# Patient Record
Sex: MALE | Race: WHITE | NOT HISPANIC OR LATINO | Employment: PART TIME | URBAN - METROPOLITAN AREA
[De-identification: names, ages, dates, MRNs, and addresses within clinical notes are randomized per-mention and may not be internally consistent; named-entity substitution may affect disease eponyms.]

---
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
## 2021-03-03 ENCOUNTER — NURSE TRIAGE (OUTPATIENT)
Dept: CALL CENTER | Facility: HOSPITAL | Age: 21
End: 2021-03-03

## 2021-03-04 NOTE — TELEPHONE ENCOUNTER
Reason for Disposition  • [1] Recent medical visit within 48 hours AND [2] new symptom AND [3] sounds mild    Additional Information  • Negative: Severe difficulty breathing (struggling for each breath, unable to speak or cry, making grunting noises with each breath, severe retractions)  • Negative: Sounds like a life-threatening emergency to the triager  • Negative: Asthma or Reactive Airway Disease diagnosed OR treated with asthma medicines  • Negative: Bronchiolitis diagnosed recently  • Negative: Ear infection diagnosed recently  • Negative: Influenza diagnosed recently  • Negative: Swimmer's ear diagnosed recently  • Negative: Mononucleosis diagnosed recently  • Negative: Sinus infection diagnosed recently  • Negative: [1] Strep throat diagnosed recently AND [2] taking antibiotic  • Negative: Pneumonia diagnosed recently  • Negative: [1] Urinary tract infection diagnosed recently AND [2] taking antibiotic  • Negative: Whooping Cough diagnosed recently  • Negative: [1] Animal or human bite infection AND [2] taking an antibiotic  • Negative: [1] Boil (skin abscess) AND [2] taking an antibiotic and/or incised and drained  • Negative: [1] Cellulitis AND [2] taking an antibiotic  • Negative: [1] Lymph node infection AND [2] taking an antibiotic  • Negative: [1] Wound infection AND [2] taking an antibiotic  • Negative: Taking antibiotic for other infection  • Negative: More than 48 hours since medical visit  • Negative: [1] Recent medical visit within 48 hours AND [2] condition/symptoms WORSE (Exception: higher fever) AND [3] diagnosis/symptoms covered by triage guideline (e.g., a cold)  • Negative: [1] Difficulty breathing (per caller) AND [2] not severe  • Negative: [1] Dehydration suspected AND [2] age < 1 year (signs: no urine > 8 hours AND very dry mouth, no tears, ill-appearing, etc.)  • Negative: [1] Dehydration suspected AND [2] age > 1 year (signs: no urine > 12 hours AND very dry mouth, no tears,  "ill-appearing, etc.)  • Negative: Child sounds very sick or weak to the triager  • Negative: Sounds like a serious complication to the triager  • Negative: Age < 6 months (Exception: triager can easily answer caller's question)  • Negative: Symptoms from chronic disease OR complex acute medical condition  • Negative: Follow-up call of rule-out sepsis work-up  • Negative: Important lab tests of urgent work-up pending (e.g., blood work-up in sick child)  • Negative: [1] Fever AND [2] > 105 F (40.6 C) by any route OR axillary > 104 F (40 C)  • Negative: [1] Has been seen for fever AND [2] fever higher AND [3] no other symptoms AND [4] caller can't be reassured  • Negative: [1] Age < 12 weeks AND [2] new-onset fever 100.4 F (38.0 C) or higher rectally  • Negative: [1] New symptom AND [2] could be serious  • Negative: [1] Recent medical visit within 48 hours AND [2] condition/symptoms worse (Exception: fever worse) AND [3] diagnosis/symptoms NOT covered by any triage guideline  • Negative: [1] Recent hospitalization AND [2] child not improved or WORSE  • Negative: Triager concerned about patient's response to recommended treatment plan  • Negative: [1] Caller has urgent question (includes prescribed medication questions) AND [2] triager unable to answer  • Negative: [1] New onset of fever AND [2] HCP said to call if this occurred  • Negative: [1] Caller has nonurgent question (includes prescribed medication questions) AND [2] triager unable to answer  • Negative: [1] Recent medical visit within 48 hours AND [2] condition/symptoms unchanged (not worse) AND [3] caller has additional questions  • Negative: [1] Recent medical visit within 48 hours AND [2] fever higher    Answer Assessment - Initial Assessment Questions  1. DIAGNOSIS:  \"What did the doctor say your child had?\"      Patient cannot remember his diagnosis from visit on Monday( he states it starts with an \"A\".  When I said Adenovirus he states \"that's what it " "was\"  2. VISIT:  \"When was your child seen?\"      [facility] on Monday   3. ONSET:  \"When did the illness begin?\"      Last Tuesday started with exhaustion.  Slept all Wednesday.  Seen on Thursday in office neg for Covid.  F/U on Monday due to still feeling ill.  Covid tested again and did some bloodwork.  Ear inflammation that could be an ear infection  4. MEDS:  \"Did your child receive any prescription meds?\"  If so, ask:  \"What are they?\" \" Were any OTC meds recommended?\"      Patient is taking an antibiotic(doesnt know the name)  5. FEVER:  \"Does your child have a fever?\"  If so, ask:  \"What is it, how was it measured and when did it start?\"      none  6. SYMPTOMS:  \"What symptom are you most concerned about?\"      Fever, fatigue, headache.  Presented today with stomach hurting \"at the bottom of his stomach\"   Patient has been \"twitchy\"  7. PATTERN:  \"Is your child the same, getting better or getting worse?\"  \"What's changed?\" If getting worse, ask, \"In what way?\"      Patient states he is continues to have headache but is hydrated and eating.    8. CHILD'S APPEARANCE:  \"How sick is your child acting?\" \" What is he doing right now?\" If asleep, ask: \"How was he acting before he went to sleep?\"      Patient is not feeling well.  He is having a headache and stomach ache.  He denies a fever.    Protocols used: RECENT MEDICAL VISIT FOR ILL[facility]      "

## 2021-05-06 ENCOUNTER — TRANSCRIBE ORDERS (OUTPATIENT)
Dept: ADMINISTRATIVE | Facility: HOSPITAL | Age: 21
End: 2021-05-06

## 2021-05-06 DIAGNOSIS — Z84.1 FAMILY HISTORY OF OTHER KIDNEY DISEASES: Primary | ICD-10-CM

## 2021-05-10 ENCOUNTER — HOSPITAL ENCOUNTER (OUTPATIENT)
Dept: ULTRASOUND IMAGING | Facility: HOSPITAL | Age: 21
Discharge: HOME | End: 2021-05-10
Admitting: PEDIATRICS

## 2021-05-10 DIAGNOSIS — Z84.1 FAMILY HISTORY OF OTHER KIDNEY DISEASES: ICD-10-CM

## 2021-05-10 PROCEDURE — 76775 US EXAM ABDO BACK WALL LIM: CPT

## 2021-05-10 PROCEDURE — 76775 US EXAM ABDO BACK WALL LIM: CPT | Performed by: RADIOLOGY
